# Patient Record
Sex: MALE | Race: BLACK OR AFRICAN AMERICAN | NOT HISPANIC OR LATINO | URBAN - METROPOLITAN AREA
[De-identification: names, ages, dates, MRNs, and addresses within clinical notes are randomized per-mention and may not be internally consistent; named-entity substitution may affect disease eponyms.]

---

## 2022-11-26 ENCOUNTER — HOSPITAL ENCOUNTER (EMERGENCY)
Facility: OTHER | Age: 47
Discharge: HOME | End: 2022-11-27
Attending: EMERGENCY MEDICINE

## 2022-11-26 DIAGNOSIS — T50.901A OD (OVERDOSE OF DRUG): ICD-10-CM

## 2022-11-26 DIAGNOSIS — T40.601A OPIATE OVERDOSE, ACCIDENTAL OR UNINTENTIONAL, INITIAL ENCOUNTER: Primary | ICD-10-CM

## 2022-11-26 LAB — POCT GLUCOSE: 72 MG/DL (ref 70–110)

## 2022-11-26 PROCEDURE — 96375 TX/PRO/DX INJ NEW DRUG ADDON: CPT

## 2022-11-26 PROCEDURE — 96374 THER/PROPH/DIAG INJ IV PUSH: CPT

## 2022-11-26 PROCEDURE — 99291 CRITICAL CARE FIRST HOUR: CPT | Mod: 25

## 2022-11-26 PROCEDURE — 63600175 PHARM REV CODE 636 W HCPCS: Performed by: EMERGENCY MEDICINE

## 2022-11-26 RX ORDER — NALOXONE HCL 0.4 MG/ML
0.4 VIAL (ML) INJECTION
Status: COMPLETED | OUTPATIENT
Start: 2022-11-26 | End: 2022-11-26

## 2022-11-26 RX ADMIN — NALOXONE HYDROCHLORIDE 0.4 MG: 0.4 INJECTION, SOLUTION INTRAMUSCULAR; INTRAVENOUS; SUBCUTANEOUS at 09:11

## 2022-11-26 RX ADMIN — NALOXONE HYDROCHLORIDE 0.4 MG: 0.4 INJECTION, SOLUTION INTRAMUSCULAR; INTRAVENOUS; SUBCUTANEOUS at 06:11

## 2022-11-26 RX ADMIN — NALOXONE HYDROCHLORIDE 0.4 MG: 0.4 INJECTION, SOLUTION INTRAMUSCULAR; INTRAVENOUS; SUBCUTANEOUS at 07:11

## 2022-11-27 VITALS
HEIGHT: 70 IN | BODY MASS INDEX: 24.98 KG/M2 | DIASTOLIC BLOOD PRESSURE: 84 MMHG | RESPIRATION RATE: 16 BRPM | WEIGHT: 174.5 LBS | SYSTOLIC BLOOD PRESSURE: 186 MMHG | OXYGEN SATURATION: 100 % | HEART RATE: 58 BPM | TEMPERATURE: 97 F

## 2022-11-27 RX ORDER — NALOXONE HYDROCHLORIDE 4 MG/.1ML
SPRAY NASAL
Qty: 1 EACH | Refills: 11 | Status: SHIPPED | OUTPATIENT
Start: 2022-11-27

## 2022-11-27 NOTE — ED PROVIDER NOTES
Encounter Date: 11/26/2022    SCRIBE #1 NOTE: IClarisa, lou scribing for, and in the presence of,  Snehal Cohen MD.     History     Chief Complaint   Patient presents with    Drug Overdose     Pt found down in hallway at apartment, agonal breathing; 2 mg IV narcan with positive response; pt admits to snorting what he thought was cocaine     Time seen by provider: 6:28 PM    This is a 47 y.o. male who presents via EMS after being found down in his apartment building PTA. Patient was found in the hallway of his apartment building with agonal breathing. EMS administered 2 mg IV narcan with positive response. Patient reports snorting a substance that he believed to be cocaine.       The history is provided by the patient and the EMS personnel. The history is limited by the condition of the patient.   Review of patient's allergies indicates:  No Known Allergies  No past medical history on file.  No past surgical history on file.  No family history on file.     Review of Systems   Reason unable to perform ROS: condition of patient.     Physical Exam     Initial Vitals [11/26/22 1743]   BP Pulse Resp Temp SpO2   (!) 175/119 68 18 97 °F (36.1 °C) 99 %      MAP       --         Physical Exam    Nursing note and vitals reviewed.  Constitutional: He appears well-developed and well-nourished.   HENT:   Head: Normocephalic and atraumatic.   Right Ear: External ear normal.   Left Ear: External ear normal.   Eyes: Lids are normal. Pupils are equal, round, and reactive to light. Right conjunctiva is not injected. Right conjunctiva has no hemorrhage. Left conjunctiva is not injected. Left conjunctiva has no hemorrhage.   Neck: Phonation normal. No stridor present. No tracheal deviation present.   Normal range of motion.  Cardiovascular:  Normal rate, regular rhythm and normal heart sounds.     Exam reveals no friction rub.       No murmur heard.  Pulmonary/Chest: No respiratory distress.   Slow respiratory rate.    Musculoskeletal:      Cervical back: Normal range of motion.     Neurological:   Positive reaction to painful stimuli.   Skin: Skin is warm.       ED Course   Critical Care    Date/Time: 11/27/2022 12:03 AM  Performed by: Snehal Cohen MD  Authorized by: Snehal Cohen MD   Direct patient critical care time: 15 minutes  Additional history critical care time: 10 minutes  Ordering / reviewing critical care time: 10 minutes  Documentation critical care time: 10 minutes  Consult with family critical care time: 15 minutes  Total critical care time (exclusive of procedural time) : 60 minutes  Critical care was necessary to treat or prevent imminent or life-threatening deterioration of the following conditions: toxidrome.  Critical care was time spent personally by me on the following activities: re-evaluation of patient's condition, development of treatment plan with patient or surrogate, evaluation of patient's response to treatment, examination of patient, obtaining history from patient or surrogate, ordering and performing treatments and interventions and review of old charts.      Labs Reviewed   POCT GLUCOSE   POCT GLUCOSE MONITORING CONTINUOUS          Imaging Results    None          Medications   naloxone 0.4 mg/mL injection 0.4 mg (0.4 mg Intravenous Given 11/26/22 1833)   naloxone 0.4 mg/mL injection 0.4 mg (0.4 mg Intravenous Given 11/26/22 1945)   naloxone 0.4 mg/mL injection 0.4 mg (0.4 mg Intravenous Given 11/26/22 2114)     Medical Decision Making:   History:   I obtained history from: EMS provider.  Clinical Tests:   Lab Tests: Ordered and Reviewed        Scribe Attestation:   Scribe #1: I performed the above scribed service and the documentation accurately describes the services I performed. I attest to the accuracy of the note.      ED Course as of 11/27/22 0004   Sat Nov 26, 2022   1842 On my initial assessment the patient was responding to painful stimulus only and required constant prompting to stay  awake and answer questions.  The only thing that he was stay was he was very scared.  After receiving 0.5 mg of Narcan, he is awake and crying.  He says he's never experienced something like this before.  He says he thought he was snorting cocaine.     [AA]   1933 Is been approximately 1 hours since he received the last dose of Narcan 0.4 mg.  At this time he is for bradycardic, near apneic, and pinpoint pupils.  Will give additional dose of Narcan.  If he requires further Narcan, he will be placed on Narcan drip. [AA]   Sun Nov 27, 2022   0002 It is now been approximately 3 hours since his last dose of Narcan in the patient has not required any further intervention.  He awakes to tactile stimulation.  Vital signs within normal limits.  I do feel he is appropriate for discharge at time. [AA]      ED Course User Index  [AA] Snehal Cohen MD               Physician Attestation for Scribe: I, Snehal Cohen  , reviewed documentation as scribed in my presence, which is both accurate and complete.      Clinical Impression:   Final diagnoses:  [T50.901A] OD (overdose of drug)             Snehal Cohen MD  11/27/22 0004